# Patient Record
Sex: MALE | Race: WHITE | Employment: FULL TIME | URBAN - METROPOLITAN AREA
[De-identification: names, ages, dates, MRNs, and addresses within clinical notes are randomized per-mention and may not be internally consistent; named-entity substitution may affect disease eponyms.]

---

## 2018-05-17 ENCOUNTER — HOSPITAL ENCOUNTER (OUTPATIENT)
Dept: PHYSICAL THERAPY | Age: 63
Discharge: HOME | End: 2018-05-17
Payer: COMMERCIAL

## 2018-05-17 PROCEDURE — 97140 MANUAL THERAPY 1/> REGIONS: CPT

## 2018-05-17 PROCEDURE — 97110 THERAPEUTIC EXERCISES: CPT

## 2018-05-17 PROCEDURE — 97162 PT EVAL MOD COMPLEX 30 MIN: CPT

## 2018-05-17 NOTE — PROGRESS NOTES
[address] PHYSICAL THERAPY    [address] [place] [address] 51, [address], 70 [facility]       Phone: [phone]  Fax: [phone] / [phone] [facility]  Patient Name: Edwin Granados : 1955   Medical   Diagnosis: R TKA Treatment Diagnosis: Right knee pain [M25.561]   Onset Date: 18     Referral Source: Gerda Biswas NP Start of Care [facility]): 2018   Prior Hospitalization: See medical history Provider #: [id number]   Prior Level of Function: Chronic history of difficulty with walking and standing    Comorbidities: HTN, 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   ===========================================================================================  Assessment / key information:  Patient is a 58year old male presenting to therapy s/p R TKA on 18. Patient reports he stayed in the hospital for 3 days and was then discharged home. Patient received 3 weeks of HHPT, however afterwards was out of town for a week an unable to perform his exercise program. Patient feels like his knee has stiffened up because of this contributing to limited motion and increased pain. Patient reports increased difficulty with walking, standing and sleeping. Patient notes symptoms feel better with exercise. Patient rates pain at worst 9/10 and 4-5/10 at best.   Objective Data: Inspection-Patient ambulates with SPC in L UE, poor TKE on the R, poor heel strike and push off on the R. Atrophy of L calf musculature noted related to PMH. Knee AROM: R 12-89 (97 degrees PROM); L 1-124. Strength Testing: fair quadriceps set on the R, able to perform SLR with minor extensor lag. Flexibility Testing: significant restriction to R HS and quadriceps musculature.  Limited patellar mobility to all planes on the R. Tenderness to R patellar tendon, R quadriceps tendon, R VL. FOTO: 45/100. Patient educated on diagnosis, prognosis, POC and HEP. Patient issued copy of HEP and denied additional questions. Patient will benefit from skilled PT in order to address these impairments and functional limitations.   ===========================================================================================  Eval Complexity: History MEDIUM  Complexity : 1-2 comorbidities / personal factors will impact the outcome/ POC ;  Examination  HIGH Complexity : 4+ Standardized tests and measures addressing body structure, function, activity limitation and / or participation in recreation ; Presentation MEDIUM Complexity : Evolving with changing characteristics ; Decision Making MEDIUM Complexity : FOTO score of 26-74; Overall Complexity MEDIUM  Problem List: pain affecting function, decrease ROM, decrease strength, impaired gait/ balance, decrease ADL/ functional abilitiies, decrease activity tolerance and decrease flexibility/ joint mobility   Treatment Plan may include any combination of the following: Therapeutic exercise, Therapeutic activities, Neuromuscular re-education, Physical agent/modality, Gait/balance training, Manual therapy, Patient education, Functional mobility training and Stair training  Patient / Family readiness to learn indicated by: asking questions, trying to perform skills and interest  Persons(s) to be included in education: patient (P)  Barriers to Learning/Limitations: no  Measures taken:    Patient Goal (s): Reduce pain, improve ROM   Patient self reported health status: good  Rehabilitation Potential: good   Short Term Goals: To be accomplished in  3  weeks:  1. Patient will demonstrate independence with HEP for self management of symptoms. 2. Patient will improve R knee flexion AROM to 110 degrees in order to improve tolerance to walking activities.  Long Term Goals:  To be accomplished in  6  weeks:  1. Patient will improve FOTO to >/= 66/100 in order to improve quality of life. 2. Patient will improve R knee AROM to 0-120 degrees in order to improve tolerance to household activities. 3. Patient will report reduction in pain at worst to 1-2/10 in order to improve tolerance to standing activities. Frequency / Duration:   Patient to be seen  2-3  times per week for 6  weeks:  Patient / Caregiver education and instruction: self care, activity modification and exercises  G-Codes (GP): carlos  Therapist Signature: Valdez Pate PT Date: 5/14/5216   Certification Period: na Time: 10:37 AM   ===========================================================================================  I certify that the above Physical Therapy Services are being furnished while the patient is under my care. I agree with the treatment plan and certify that this therapy is necessary. Physician Signature:        Date:       Time:     Please sign and return to [facility] or you may fax the signed copy to [phone]. Thank you.

## 2018-05-17 NOTE — PROGRESS NOTES
PHYSICAL THERAPY - DAILY TREATMENT NOTE    Patient Name: Sruthi Wallace        Date: 2018  : 1955   YES Patient  Verified  Visit #:   1     Insurance: Payor: Phyllis Clement / Plan: [address] PT / Product Type: Commerical /      In time: 1000 Out time: 09   Total Treatment Time: 35     Medicare Time Tracking (below)   Total Timed Codes (min):  na 1:1 Treatment Time:  na     TREATMENT AREA =  Right knee pain [M25.561]    SUBJECTIVE  Pain Level (on 0 to 10 scale):  4-5  / 10   Medication Changes/New allergies or changes in medical history, any new surgeries or procedures? NO    If yes, update Summary List   Subjective Functional Status/Changes:  []  No changes reported     See POC          OBJECTIVE    5 (NB) min Therapeutic Exercise:  [x]  See flow sheet   Rationale:      increase ROM to improve the patients ability to perform walking activities. 10 min Manual Therapy: R knee flexion PROM, STM to R quadriceps, R patellar mobs   Rationale:      decrease pain, increase ROM, increase tissue extensibility and decrease trigger points to improve patient's ability to perform transfers. min Patient Education:  YES  Reviewed HEP   []  Progressed/Changed HEP based on: Other Objective/Functional Measures:    See POC     Post Treatment Pain Level (on 0 to 10) scale:   4-5  / 10     ASSESSMENT  Assessment/Changes in Function:     See POC     []  See Progress Note/Recertification   Patient will continue to benefit from skilled PT services to modify and progress therapeutic interventions, address functional mobility deficits, address ROM deficits, address strength deficits, analyze and address soft tissue restrictions, analyze and cue movement patterns, analyze and modify body mechanics/ergonomics and assess and modify postural abnormalities to attain remaining goals.    Progress toward goals / Updated goals:    See POC     PLAN  [x]  Upgrade activities as tolerated YES Continue plan of care   []  Discharge due to :    []  Other:      Therapist: Anna Ware, PT    Date: 5/17/2018 Time: 10:43 AM     Future Appointments  Date Time Provider Eunice Wright   5/21/2018 7:30 AM Anna Ware, PT [facility]   5/23/2018 11:00 AM Juan David Guerin, PT [facility]   5/25/2018 8:00 AM Anna Ware, PT [facility]   5/31/2018 2:00 PM Anna Ware, PT [facility]   6/1/2018 3:00 PM Anna Ware, PT [facility]   6/4/2018 8:00 AM Anna Ware, PT [facility]   6/6/2018 8:00 AM Yadira Brooks, PT [facility]   6/8/2018 7:30 AM Anna Ware, PT [facility]   6/11/2018 9:30 AM Yadira Brooks, PT [facility]   6/13/2018 8:30 AM Yadira Brooks, PT [facility]   6/15/2018 8:00 AM Anna Ware, PT [facility]   6/18/2018 10:00 AM Yadira Brooks, PT [facility]   6/20/2018 9:00 AM Yadira Brooks, PT [facility]   6/22/2018 8:00 AM Anna Ware, PT [facility]

## 2018-05-21 ENCOUNTER — HOSPITAL ENCOUNTER (OUTPATIENT)
Dept: PHYSICAL THERAPY | Age: 63
Discharge: HOME | End: 2018-05-21
Payer: COMMERCIAL

## 2018-05-21 PROCEDURE — 97110 THERAPEUTIC EXERCISES: CPT

## 2018-05-21 PROCEDURE — 97140 MANUAL THERAPY 1/> REGIONS: CPT

## 2018-05-21 NOTE — PROGRESS NOTES
PHYSICAL THERAPY - DAILY TREATMENT NOTE    Patient Name: Alfredo Loja        Date: 2018  : 1955   YES Patient  Verified  Visit #:   2     Insurance: Payor: [employer] / Plan: [address] PT / Product Type: Commerical /      In time: 730 Out time: 396   Total Treatment Time: 61     Medicare Time Tracking (below)   Total Timed Codes (min):  na 1:1 Treatment Time:  na     TREATMENT AREA =  Right knee pain [M25.561]    SUBJECTIVE  Pain Level (on 0 to 10 scale):  3-4  / 10   Medication Changes/New allergies or changes in medical history, any new surgeries or procedures? NO    If yes, update Summary List   Subjective Functional Status/Changes:  []  No changes reported     Patient states his knee felt much better after his initial evaluation which he related to the manual therapy. Patient states he was actually able to sit in his car comfortably on his ride to [place] over the weekend. OBJECTIVE  Modalities Rationale:     decrease inflammation and decrease pain to improve patient's ability to perform transfers.     min [] Estim, type/location:                                      []  att     []  unatt     []  w/US     []  w/ice    []  w/heat    min []  Mechanical Traction: type/lbs                   []  pro   []  sup   []  int   []  cont    []  before manual    []  after manual    min []  Ultrasound, settings/location:      min []  Iontophoresis w/ dexamethasone, location:                                               []  take home patch       []  in clinic   10 min [x]  Ice     []  Heat    location/position: To R knee in supine with bolster    min []  Vasopneumatic Device, press/temp:     min []  Other:    [x] Skin assessment post-treatment (if applicable):    [x]  intact    []  redness- no adverse reaction     []redness  adverse reaction:        39 min Therapeutic Exercise:  [x]  See flow sheet   Rationale:      increase ROM, increase strength, improve coordination and improve balance to improve the patients ability to perform walking activities. 12 min Manual Therapy: STM to R quadriceps, R patellar mobs; R HS stretch; R knee flexion PROM   Rationale:      decrease pain, increase ROM, increase tissue extensibility and decrease trigger points to improve patient's ability to perform standing activities. min Patient Education:  YES  Reviewed HEP   []  Progressed/Changed HEP based on: Other Objective/Functional Measures:    Progressed therapy program per flowsheet in order to improve R knee ROM, flexibility and strength  Patient cued throughout session on proper walking mechanics, patient presents with tendency to ambulate with out toeing on the R and was cued several times to correct this  R knee flexion PROM measured to 100 degrees after manual therapy     Post Treatment Pain Level (on 0 to 10) scale:   3  / 10     ASSESSMENT  Assessment/Changes in Function:     Patient tolerated session well. Educated patient to continue with HEP as prescribed and to focus on proper walking mechanics outside of PT. Patient acknowledged understanding. []  See Progress Note/Recertification   Patient will continue to benefit from skilled PT services to modify and progress therapeutic interventions, address functional mobility deficits, address ROM deficits, address strength deficits, analyze and address soft tissue restrictions, analyze and cue movement patterns, analyze and modify body mechanics/ergonomics and assess and modify postural abnormalities to attain remaining goals.    Progress toward goals / Updated goals:    Progressing with STG#2     PLAN  [x]  Upgrade activities as tolerated YES Continue plan of care   []  Discharge due to :    []  Other:      Therapist: Casandra Gonzales, PT    Date: 5/21/2018 Time: 8:53 AM     Future Appointments  Date Time Provider Eunice Wright   5/23/2018 11:00 AM [address] [place] [address], PT [facility]   5/25/2018 8:00 AM Kayla Romberg [facility]   5/31/2018 2:00 PM Erick Grant, PT [facility]   6/1/2018 3:00 PM Erick Grant, PT [facility]   6/4/2018 8:00 AM Erick Grant, PT [facility]   6/6/2018 8:00 AM Monica Brooks, PT [facility]   6/8/2018 7:30 AM Erick Grant, PT [facility]   6/11/2018 9:30 AM Monica Brooks, PT [facility]   6/13/2018 8:30 AM Monica Brooks, PT [facility]   6/15/2018 8:00 AM Erick Grant, PT [facility]   6/18/2018 10:00 AM Monica Brooks, PT [facility]   6/20/2018 9:00 AM Monica Brooks, PT [facility]   6/22/2018 8:00 AM Erick Grant, PT [facility]

## 2018-05-23 ENCOUNTER — HOSPITAL ENCOUNTER (OUTPATIENT)
Dept: PHYSICAL THERAPY | Age: 63
Discharge: HOME | End: 2018-05-23
Payer: COMMERCIAL

## 2018-05-23 PROCEDURE — 97110 THERAPEUTIC EXERCISES: CPT

## 2018-05-23 PROCEDURE — 97140 MANUAL THERAPY 1/> REGIONS: CPT

## 2018-05-23 NOTE — PROGRESS NOTES
PHYSICAL THERAPY - DAILY TREATMENT NOTE    Patient Name: Triny Hill        Date: 2018  : 1955   yes Patient  Verified  Visit #:   3     Insurance: Payor: Melida [employer] / Plan: [address] PT / Product Type: Commerical /      In time: 11:03 Out time: 11:57   Total Treatment Time: 54     Medicare Time Tracking (below)   Total Timed Codes (min):  na 1:1 Treatment Time:  na     TREATMENT AREA =  Right knee pain [M25.561]    SUBJECTIVE  Pain Level (on 0 to 10 scale):  4  / 10   Medication Changes/New allergies or changes in medical history, any new surgeries or procedures?    no  If yes, update Summary List   Subjective Functional Status/Changes:  []  No changes reported     Pt reports his knee felt much better after his last visit. Reports he iced his knee later that evening. Reports partial compliance to HEP due to busy schedule          OBJECTIVE  Modalities Rationale:     decrease inflammation and decrease pain to improve patient's ability to ambulate   min [] Estim, type/location:                                      []  att     []  unatt     []  w/US     []  w/ice    []  w/heat    min []  Mechanical Traction: type/lbs                   []  pro   []  sup   []  int   []  cont    []  before manual    []  after manual    min []  Ultrasound, settings/location:      min []  Iontophoresis w/ dexamethasone, location:                                               []  take home patch       []  in clinic   10 min [x]  Ice     []  Heat    location/position:  To the R knee in supine w/ bolster    min []  Vasopneumatic Device, press/temp:     min []  Other:    [x] Skin assessment post-treatment (if applicable):    [x]  intact    [x]  redness- no adverse reaction     []redness  adverse reaction:        34 min Therapeutic Exercise:  [x]  See flow sheet   Rationale:      increase ROM, increase strength, improve coordination and improve balance to improve the patients ability to ambulate     10 min Manual Therapy: stm to R RF, MFR to medial joint line, inf patella mob, PROM R knee flex, supine HS str   Rationale:      decrease pain, increase ROM, increase tissue extensibility and decrease trigger points to improve patient's ability to ambulate       min Patient Education:  yes  Reviewed HEP   []  Progressed/Changed HEP based on: Other Objective/Functional Measures:    Pt ambulating w/ SPC R, as he states this is better for his balance. Discussed appropriate use of SPC on opposite side of TKA, however pt insisted due to hx of L LE deficits he has to use to on the R    Noted R LE circumduction and ankle EV to clear juan when leading w/ the L LE     Post Treatment Pain Level (on 0 to 10) scale:   3  / 10     ASSESSMENT  Assessment/Changes in Function:     Pt c/o soreness/discomfort at end available range knee flexion PROM. Discussed importance of compliance to HEP as rx     []  See Progress Note/Recertification   Patient will continue to benefit from skilled PT services to modify and progress therapeutic interventions, address functional mobility deficits, address ROM deficits, address strength deficits, analyze and address soft tissue restrictions, analyze and cue movement patterns, analyze and modify body mechanics/ergonomics, assess and modify postural abnormalities and instruct in home and community integration to attain remaining goals.    Progress toward goals / Updated goals:    Slow progress to STG #1, reports minimal compliance to HEP due to busy schedule     PLAN  []  Upgrade activities as tolerated yes Continue plan of care   []  Discharge due to :    []  Other:      Therapist: Lucina Unger PT    Date: 5/23/2018 Time: 11:18 AM     Future Appointments  Date Time Provider Eunice Wright   5/25/2018 8:00 AM Desi Allen PT [facility]   5/31/2018 2:00 PM Desi Allen PT [facility]   6/1/2018 3:00 PM Desi Allen PT [facility]   6/4/2018 8:00 AM [facility]   6/6/2018 8:00 AM Manoj Brooks, PT [facility]   6/8/2018 7:30 AM Luis Sánchez, PT [facility]   6/11/2018 9:30 AM Manoj Brooks[facility]   6/13/2018 8:30 AM Manoj [facility]   6/15/2018 8:00 AM Luis Sánchez, PT [facility]   6/18/2018 10:00 AM Manoj Brooks, PT [facility]   6/20/2018 9:00 AM Manoj Brooks, PT [facility]   6/22/2018 8:00 AM Luis Sánchez, PT [facility]

## 2018-05-25 ENCOUNTER — HOSPITAL ENCOUNTER (OUTPATIENT)
Dept: PHYSICAL THERAPY | Age: 63
Discharge: HOME | End: 2018-05-25
Payer: COMMERCIAL

## 2018-05-25 PROCEDURE — 97110 THERAPEUTIC EXERCISES: CPT

## 2018-05-25 PROCEDURE — 97140 MANUAL THERAPY 1/> REGIONS: CPT

## 2018-05-25 NOTE — PROGRESS NOTES
PHYSICAL THERAPY - DAILY TREATMENT NOTE    Patient Name: Mayank Rocha        Date: 2018  : 1955   YES Patient  Verified  Visit #:   4     Insurance: Payor: Marina Cantu / Plan: [address] PT / Product Type: Commerical /      In time: 530 Out time: 900   Total Treatment Time: 58     Medicare Time Tracking (below)   Total Timed Codes (min):  na 1:1 Treatment Time:  na     TREATMENT AREA =  Right knee pain [M25.561]    SUBJECTIVE  Pain Level (on 0 to 10 scale):  4  / 10   Medication Changes/New allergies or changes in medical history, any new surgeries or procedures? NO    If yes, update Summary List   Subjective Functional Status/Changes:  []  No changes reported     Patient states his knee is sore today because he was trying to fix his car most of yesterday and crawling into the tight spaces was difficulty. OBJECTIVE  Modalities Rationale:     decrease inflammation and decrease pain to improve patient's ability to perform transfers.     min [] Estim, type/location:                                      []  att     []  unatt     []  w/US     []  w/ice    []  w/heat    min []  Mechanical Traction: type/lbs                   []  pro   []  sup   []  int   []  cont    []  before manual    []  after manual    min []  Ultrasound, settings/location:      min []  Iontophoresis w/ dexamethasone, location:                                               []  take home patch       []  in clinic   10 min [x]  Ice     []  Heat    location/position: To R knee in supine with bolster    min []  Vasopneumatic Device, press/temp:     min []  Other:    [x] Skin assessment post-treatment (if applicable):    [x]  intact    []  redness- no adverse reaction     []redness  adverse reaction:         33 min Therapeutic Exercise:  [x]  See flow sheet   Rationale:      increase ROM, increase strength, improve coordination and improve balance to improve the patients ability to perform walking activities. 15 min Manual Therapy: STM to R quadriceps, R patellar mobs, R HS stretch, R knee flexion PROM   Rationale:      decrease pain, increase ROM, increase tissue extensibility and decrease trigger points to improve patient's ability to perform standing activities. min Patient Education:  YES  Reviewed HEP   []  Progressed/Changed HEP based on: Other Objective/Functional Measures:    R knee AROM post manual therapy 8-105 degrees  Continued tenderness to lateral quadriceps musculature during MT  Cuing throughout juan negotiation in order to improve heel strike and avoid hip circumduction on the R     Post Treatment Pain Level (on 0 to 10) scale:   4  / 10     ASSESSMENT  Assessment/Changes in Function:     Patient denied changes post session. Educated to continue with HEP on a regular basis in order to further improve ROM and gait  deficits. []  See Progress Note/Recertification   Patient will continue to benefit from skilled PT services to modify and progress therapeutic interventions, address functional mobility deficits, address ROM deficits, address strength deficits, analyze and address soft tissue restrictions, analyze and cue movement patterns, analyze and modify body mechanics/ergonomics and assess and modify postural abnormalities to attain remaining goals.    Progress toward goals / Updated goals:    Progressing with LTG#2     PLAN  [x]  Upgrade activities as tolerated YES Continue plan of care   []  Discharge due to :    []  Other:      Therapist: Sydni Collins PT    Date: 5/25/2018 Time: 9:25 AM     Future Appointments  Date Time Provider Eunice Wright   5/31/2018 2:00 PM Sydni Collins PT [facility]   6/1/2018 3:00 PM Sydni Collins PT [facility]   6/4/2018 8:00 AM Sydni Collins PT [facility]   6/6/2018 8:00 AM [address] [place] [address]   6/8/2018 7:30 AM Sydni Collins PT [facility]   6/11/2018 9:30 AM [address] [place] [address], [facility] 6/13/2018 8:30 AM Jihan Brooks, PT [facility]   6/15/2018 8:00 AM Ana María Vega, PT [facility]   6/18/2018 10:00 AM Jihan Brooks, PT [facility]   6/20/2018 9:00 AM Jihan Brooks, PT [facility]   6/22/2018 8:00 AM Ana María Vega, PT [facility]

## 2018-05-29 ENCOUNTER — HOSPITAL ENCOUNTER (OUTPATIENT)
Dept: PHYSICAL THERAPY | Age: 63
Discharge: HOME | End: 2018-05-29
Payer: COMMERCIAL

## 2018-05-29 PROCEDURE — 97140 MANUAL THERAPY 1/> REGIONS: CPT

## 2018-05-29 NOTE — PROGRESS NOTES
PHYSICAL THERAPY - DAILY TREATMENT NOTE    Patient Name: Alfredo Loja        Date: 2018  : 1955   YES Patient  Verified  Visit #:     Insurance: Payor: Tori Deandre / Plan: [address] / Product Type: Commerical /      In time:  Out time:    Total Treatment Time: 28     Medicare Time Tracking (below)   Total Timed Codes (min):  na 1:1 Treatment Time:  na     TREATMENT AREA =  Right knee pain [M25.561]    SUBJECTIVE  Pain Level (on 0 to 10 scale):  3  / 10   Medication Changes/New allergies or changes in medical history, any new surgeries or procedures? NO    If yes, update Summary List   Subjective Functional Status/Changes:  []  No changes reported     Patient reports a 60% improvement in symptoms since the start of therapy. Patient reports improved ability to transfer in and out of his car as well as improved motion overall. Patient rates his pain at worst as a 7/10 and on average 3.5/10. OBJECTIVE    6 (NB) min Therapeutic Exercise:  [x]  See flow sheet   Rationale:      increase ROM to improve the patients ability to perform walking activities. 22 min Manual Therapy: STM to R quadriceps, R patellar mobs, R HS stretch, R knee flexion PROM   Rationale:      decrease pain, increase ROM, increase tissue extensibility and decrease trigger points to improve patient's ability to perform standing activities. min Patient Education:  YES  Reviewed HEP   []  Progressed/Changed HEP based on:        Other Objective/Functional Measures:    See PN     Post Treatment Pain Level (on 0 to 10) scale:   5  / 10     ASSESSMENT  Assessment/Changes in Function:     See PN     []  See Progress Note/Recertification   Patient will continue to benefit from skilled PT services to modify and progress therapeutic interventions, address functional mobility deficits, address ROM deficits, address strength deficits, analyze and address soft tissue restrictions, analyze and cue movement patterns, analyze and modify body mechanics/ergonomics and assess and modify postural abnormalities to attain remaining goals.    Progress toward goals / Updated goals:    See PN     PLAN  [x]  Upgrade activities as tolerated YES Continue plan of care   []  Discharge due to :    []  Other:      Therapist: Ghulam Gallardo PT    Date: 5/29/2018 Time: 2:25 PM     Future Appointments  Date Time Provider Eunice Wright   5/31/2018 2:00 PM Ghulam Gallardo, PT [facility]   6/1/2018 3:00 PM Ghulam Gallardo, PT [facility]   6/4/2018 8:00 AM Ghulam Gallardo, PT [facility]   6/6/2018 8:00 AM Astrid Brooks, PT [facility]   6/8/2018 7:30 AM Ghulam Gallardo, PT [facility]   6/11/2018 9:30 AM Astrid Brooks, PT [facility]   6/13/2018 8:30 AM Astrid Brooks, PT [facility]   6/15/2018 8:00 AM Ghulam Gallardo, PT [facility]   6/18/2018 10:00 AM Astrid Brooks, PT [facility]   6/20/2018 9:00 AM Astrid Brooks, PT [facility]   6/22/2018 8:00 AM Ghulam Gallardo, PT [facility]

## 2018-05-29 NOTE — PROGRESS NOTES
[address]  [facility] THERAPY  [address] [facility] [address],[facility], [address] [facility] - Phone: [phone]  Fax: [phone]  PROGRESS NOTE  Patient Name: Elvin Gutierres : 1955   Treatment/Medical Diagnosis: Right knee pain [M25.561]   Referral Source: Rosalinda Nesbitt NP     Date of Initial Visit: 18 Attended Visits: 5 Missed Visits: 0     SUMMARY OF TREATMENT  Patient has attended 4 follow up visits after his initial evaluation on 18. Patient has received therapeutic exercise, manual therapy and modalities in order to improve R knee ROM, mobility, flexibility, strength and gait mechanics. CURRENT STATUS  Patient reports a 60% improvement in symptoms since the start of therapy. Patient feels his motion has gradually improved which has helped him get in and out of his car easier. Patient has previously reported moderate compliance with his home exercises. Patient rates his pain at worst as a 7/10 and on average 3.5/10. Objectively, patient's R knee AROM is as follows: 8-110 after manual therapy therapy. Patient does require cuing on proper gait mechanics as he demonstrates a tendency to ambulate with poor heel strike and out toeing on the R. Patient would continue to benefit from PT in order to further address deficits and improve function. Goal/Measure of Progress Goal Met? 1. Patient will improve FOTO to >/= 66/100 in order to improve quality of life   Status at last Eval: 45/100 Current Status: n/a n/a   2. Patient will improve R knee AROM to 0-120 degrees in order to improve tolerance to household activities   Status at last Eval:  Current Status: 8-110 progressing   3. Patient will report reduction in pain at worst to 1-2/10 in order to improve tolerance to standing activities   Status at last Eval: 9/10 Current Status: 7/10 progressing     New Goals to be achieved in __3__  weeks:  1. Continue with LTG#1  2. Continue with LTG#2  3. Continue with LTG#3  RECOMMENDATIONS  Patient would benefit from continued PT 1-2x/week for 3 weeks in order to further address impairments and functional limitations. If you have any questions/comments please contact us directly at [phone]. Thank you for allowing us to assist in the care of your patient. Therapist Signature: Jacquelyn Aguilar, PT Date: 5/29/2018     Time: 2:15 PM   NOTE TO PHYSICIAN:  PLEASE COMPLETE THE ORDERS BELOW AND FAX TO   [facility] Physical [facility]: [phone]  If you are unable to process this request in 24 hours please contact our office: [phone]    ___ I have read the above report and request that my patient continue as recommended.   ___ I have read the above report and request that my patient continue therapy with the following changes/special instructions:_________________________________________________________   ___ I have read the above report and request that my patient be discharged from therapy.      Physician Signature:        Date:       Time:

## 2018-05-31 ENCOUNTER — HOSPITAL ENCOUNTER (OUTPATIENT)
Dept: PHYSICAL THERAPY | Age: 63
Discharge: HOME | End: 2018-05-31
Payer: COMMERCIAL

## 2018-05-31 PROCEDURE — 97140 MANUAL THERAPY 1/> REGIONS: CPT

## 2018-05-31 PROCEDURE — 97110 THERAPEUTIC EXERCISES: CPT

## 2018-05-31 NOTE — PROGRESS NOTES
PHYSICAL THERAPY - DAILY TREATMENT NOTE    Patient Name: Alfredo Loja        Date: 2018  : 1955   YES Patient  Verified  Visit #:     Insurance: Payor: Tori Carrera / Plan: [address] / Product Type: Commerical /      In time: 202 Out time: 253   Total Treatment Time: 51     Medicare Time Tracking (below)   Total Timed Codes (min):  na 1:1 Treatment Time:  na     TREATMENT AREA =  Right knee pain [M25.561]    SUBJECTIVE  Pain Level (on 0 to 10 scale):  5  / 10   Medication Changes/New allergies or changes in medical history, any new surgeries or procedures? NO    If yes, update Summary List   Subjective Functional Status/Changes:  []  No changes reported     Patient reports his follow up appointment with his MD went well, were pleased with his overall progress. Patient states his knee is a little more sore today which he relates to being busy the past few days. OBJECTIVE  Modalities Rationale:     decrease inflammation and decrease pain to improve patient's ability to perform transfers.     min [] Estim, type/location:                                      []  att     []  unatt     []  w/US     []  w/ice    []  w/heat    min []  Mechanical Traction: type/lbs                   []  pro   []  sup   []  int   []  cont    []  before manual    []  after manual    min []  Ultrasound, settings/location:      min []  Iontophoresis w/ dexamethasone, location:                                               []  take home patch       []  in clinic   10 min [x]  Ice     []  Heat    location/position: To R knee in supine with bolster    min []  Vasopneumatic Device, press/temp:     min []  Other:    [x] Skin assessment post-treatment (if applicable):    [x]  intact    []  redness- no adverse reaction     []redness  adverse reaction:        31 min Therapeutic Exercise:  [x]  See flow sheet   Rationale:      increase ROM, increase strength, improve coordination and improve balance to improve the patients ability to perform walking activities. 12 min Manual Therapy: STM to R quadriceps, R patellar mobs, R HS stretch, R knee flexion PROM   Rationale:      decrease pain, increase ROM, increase tissue extensibility and decrease trigger points to improve patient's ability to perform work activities. min Patient Education:  YES  Reviewed HEP   []  Progressed/Changed HEP based on: Other Objective/Functional Measures:    R knee flexion PROM during manual therapy 112 degrees, 118 degrees with AAROM during heel slides  Improved technique with juan negotiation this session, cleared small hurdles without compensatory strategies. Post Treatment Pain Level (on 0 to 10) scale:   4  / 10     ASSESSMENT  Assessment/Changes in Function:     Patient reported slight reduction in symptoms post session. Patient is progressing well in regards to knee ROM and function at this time. []  See Progress Note/Recertification   Patient will continue to benefit from skilled PT services to modify and progress therapeutic interventions, address functional mobility deficits, address ROM deficits, address strength deficits, analyze and address soft tissue restrictions, analyze and cue movement patterns, analyze and modify body mechanics/ergonomics and assess and modify postural abnormalities to attain remaining goals.    Progress toward goals / Updated goals:    Progressing well with LTG#2     PLAN  [x]  Upgrade activities as tolerated YES Continue plan of care   []  Discharge due to :    []  Other:      Therapist: Marlon Beck PT    Date: 5/31/2018 Time: 2:48 PM     Future Appointments  Date Time Provider Eunice Wright   6/1/2018 3:00 PM Marlon Beck PT [facility]   6/4/2018 8:00 AM Marlon Beck PT [facility]   6/6/2018 8:00 AM Manjinder Stark PT [facility]   6/8/2018 7:30 AM Marlon Beck PT [facility]   6/11/2018 9:30 AM Manjinder Stark PT [facility]   6/13/2018 8:30 AM Leda Fox, PT [facility]   6/15/2018 8:00 AM Ebonie Eisenberg, PT [facility]   6/18/2018 10:00 AM Dontae Brooks, PT [facility]   6/20/2018 9:00 AM Dontae Brooks, PT [facility]   6/22/2018 8:00 AM Ebonie Eisenberg, PT [facility]

## 2018-06-01 ENCOUNTER — HOSPITAL ENCOUNTER (OUTPATIENT)
Dept: PHYSICAL THERAPY | Age: 63
Discharge: HOME | End: 2018-06-01
Payer: COMMERCIAL

## 2018-06-01 PROCEDURE — 97110 THERAPEUTIC EXERCISES: CPT

## 2018-06-01 PROCEDURE — 97140 MANUAL THERAPY 1/> REGIONS: CPT

## 2018-06-01 NOTE — PROGRESS NOTES
PHYSICAL THERAPY - DAILY TREATMENT NOTE    Patient Name: Mayank Rocha        Date: 2018  : 1955   YES Patient  Verified  Visit #:     Insurance: Payor: Marina Cantu / Plan: [address] PT / Product Type: Commerical /      In time: 305 Out time: 400   Total Treatment Time: 55     Medicare Time Tracking (below)   Total Timed Codes (min):  na 1:1 Treatment Time:  na     TREATMENT AREA =  Right knee pain [M25.561]    SUBJECTIVE  Pain Level (on 0 to 10 scale):  5  / 10   Medication Changes/New allergies or changes in medical history, any new surgeries or procedures? NO    If yes, update Summary List   Subjective Functional Status/Changes:  []  No changes reported     Patient reports he is sore from doing a lot of \"running around\" prior to today's session. OBJECTIVE  Modalities Rationale:     decrease inflammation and decrease pain to improve patient's ability to perform transfers. min [] Estim, type/location:                                      []  att     []  unatt     []  w/US     []  w/ice    []  w/heat    min []  Mechanical Traction: type/lbs                   []  pro   []  sup   []  int   []  cont    []  before manual    []  after manual    min []  Ultrasound, settings/location:      min []  Iontophoresis w/ dexamethasone, location:                                               []  take home patch       []  in clinic   10 min [x]  Ice     []  Heat    location/position: To R knee in supine with bolster    min []  Vasopneumatic Device, press/temp:     min []  Other:    [x] Skin assessment post-treatment (if applicable):    [x]  intact    []  redness- no adverse reaction     []redness  adverse reaction:        30 min Therapeutic Exercise:  [x]  See flow sheet   Rationale:      increase ROM, increase strength, improve coordination and improve balance to improve the patients ability to perform walking activities.       15 min Manual Therapy: STM to R quadriceps, R patellar mobs, R HS stretch, R knee flexion PROM   Rationale:      decrease pain, increase ROM, increase tissue extensibility and decrease trigger points to improve patient's ability to perform standing activities. min Patient Education:  YES  Reviewed HEP   []  Progressed/Changed HEP based on: Other Objective/Functional Measures:    R knee flexion PROM measured to 114 degrees after manual therapy  Added SL LP with 60# this session for improved quadriceps strength-completed with minimal difficulty, plan to progress at NV  Progressed step ups to 8 inches this session     Post Treatment Pain Level (on 0 to 10) scale:   4  / 10     ASSESSMENT  Assessment/Changes in Function:     Patient tolerated progression of program well this visit. Patient is demonstrating improved R knee AROM and strength over the past several visits contributing to improved ADL function. []  See Progress Note/Recertification   Patient will continue to benefit from skilled PT services to modify and progress therapeutic interventions, address functional mobility deficits, address ROM deficits, address strength deficits, analyze and address soft tissue restrictions, analyze and cue movement patterns, analyze and modify body mechanics/ergonomics, assess and modify postural abnormalities and address imbalance/dizziness to attain remaining goals.    Progress toward goals / Updated goals:    Progressing with LTG#3     PLAN  [x]  Upgrade activities as tolerated YES Continue plan of care   []  Discharge due to :    []  Other:      Therapist: Theresa Hirsch PT    Date: 6/1/2018 Time: 4:09 PM     Future Appointments  Date Time Provider Eunice Wright   6/4/2018 8:00 AM Theresa Hirsch PT [facility]   6/6/2018 8:00 AM [address] [place] [address], PT [facility]   6/8/2018 7:30 AM Theresa Hircsh PT [facility]   6/11/2018 9:30 AM Wilber Brooks, PT [facility]   6/13/2018 8:30 AM Wilber Brooks, PT [facility]   6/15/2018 8:00 AM Yonny Hopper [facility]   6/18/2018 10:00 AM Wilber Brooks, PT [facility]   6/20/2018 9:00 AM Ivan Noland, PT [facility]   6/22/2018 8:00 AM Theresa Hirsch, PT [facility]

## 2018-06-04 ENCOUNTER — HOSPITAL ENCOUNTER (OUTPATIENT)
Dept: PHYSICAL THERAPY | Age: 63
Discharge: HOME | End: 2018-06-04
Payer: COMMERCIAL

## 2018-06-04 PROCEDURE — 97110 THERAPEUTIC EXERCISES: CPT

## 2018-06-04 PROCEDURE — 97140 MANUAL THERAPY 1/> REGIONS: CPT

## 2018-06-04 NOTE — PROGRESS NOTES
PHYSICAL THERAPY - DAILY TREATMENT NOTE    Patient Name: Diana oRbert        Date: 2018  : 1955   YES Patient  Verified  Visit #:     Insurance: Payor: Anabela Reyes / Plan: [address] / Product Type: Commerical /      In time: 232 Out time: 682   Total Treatment Time: 55     Medicare Time Tracking (below)   Total Timed Codes (min):  na 1:1 Treatment Time:  na     TREATMENT AREA =  Right knee pain [M25.561]    SUBJECTIVE  Pain Level (on 0 to 10 scale):  2-3  / 10   Medication Changes/New allergies or changes in medical history, any new surgeries or procedures? NO    If yes, update Summary List   Subjective Functional Status/Changes:  []  No changes reported     Patient reports his knee is feeling better today compared to last week. Patient denies complications since his LV. OBJECTIVE  Modalities Rationale:     decrease inflammation and decrease pain to improve patient's ability to perform transfers. min [] Estim, type/location:                                      []  att     []  unatt     []  w/US     []  w/ice    []  w/heat    min []  Mechanical Traction: type/lbs                   []  pro   []  sup   []  int   []  cont    []  before manual    []  after manual    min []  Ultrasound, settings/location:      min []  Iontophoresis w/ dexamethasone, location:                                               []  take home patch       []  in clinic   10 min [x]  Ice     []  Heat    location/position: To R knee in supine with bolster    min []  Vasopneumatic Device, press/temp:     min []  Other:    [x] Skin assessment post-treatment (if applicable):    [x]  intact    []  redness- no adverse reaction     []redness  adverse reaction:        30 min Therapeutic Exercise:  [x]  See flow sheet   Rationale:      increase ROM, increase strength, improve coordination and improve balance to improve the patients ability to perform walking activities.       15 min Manual Therapy: STM to R quadriceps, R patellar mobs, R HS stretch, R knee flexion PROM   Rationale:      decrease pain, increase ROM, increase tissue extensibility and decrease trigger points to improve patient's ability to perform standing activities. min Patient Education:  YES  Reviewed HEP   []  Progressed/Changed HEP based on: Other Objective/Functional Measures:    R knee flexion measured to 118 degrees after manual therapy. Progressed SL LP to 62# without complication or significant increase in fatigue  Continues to require cuing to correct R out toeing during general gait as well as juan negotiation     Post Treatment Pain Level (on 0 to 10) scale:   4  / 10     ASSESSMENT  Assessment/Changes in Function:     Patient continues to respond well to PT treatment as he is progressing with AROM and strength. Patient continues to demonstrate altered gait  which can be partially attributed PMH and deficits with L LE.      []  See Progress Note/Recertification   Patient will continue to benefit from skilled PT services to modify and progress therapeutic interventions, address functional mobility deficits, address ROM deficits, address strength deficits, analyze and address soft tissue restrictions, analyze and cue movement patterns, analyze and modify body mechanics/ergonomics, assess and modify postural abnormalities and address imbalance/dizziness to attain remaining goals.    Progress toward goals / Updated goals:    Progressing with LTG#2     PLAN  [x]  Upgrade activities as tolerated YES Continue plan of care   []  Discharge due to :    []  Other:      Therapist: Sydni Collins PT    Date: 6/4/2018 Time: 9:39 AM     Future Appointments  Date Time Provider Eunice Wright   6/6/2018 8:00 AM [address] [place] [address], PT [facility]   6/8/2018 7:30 AM Sydni Collins PT [facility]   6/11/2018 9:30 AM [address] [place] [address], PT [facility]   6/13/2018 8:30 AM [address] [place] [address], PT [facility]   6/15/2018 8:00 AM Cesar Mistry, PT [place] [address]   6/18/2018 10:00 AM Rafy Brooks, PT [place] [address]   6/20/2018 9:00 AM Dejah Murphy, PT [place] [address]   6/22/2018 8:00 AM Cesar Mistry, PT [address]

## 2018-06-06 ENCOUNTER — HOSPITAL ENCOUNTER (OUTPATIENT)
Dept: PHYSICAL THERAPY | Age: 63
Discharge: HOME | End: 2018-06-06
Payer: COMMERCIAL

## 2018-06-06 PROCEDURE — 97110 THERAPEUTIC EXERCISES: CPT

## 2018-06-06 PROCEDURE — 97140 MANUAL THERAPY 1/> REGIONS: CPT

## 2018-06-06 NOTE — PROGRESS NOTES
PHYSICAL THERAPY - DAILY TREATMENT NOTE    Patient Name: Kash Huffman        Date: 2018  : 1955   yes Patient  Verified  Visit #:     Insurance: Payor: Sher Salguero / Plan: [address] / Product Type: Commerical /      In time: 8:02 Out time: 8:53   Total Treatment Time: 51     Medicare Time Tracking (below)   Total Timed Codes (min):  na 1:1 Treatment Time:  na     TREATMENT AREA =  Right knee pain [M25.561]    SUBJECTIVE  Pain Level (on 0 to 10 scale):  5  / 10   Medication Changes/New allergies or changes in medical history, any new surgeries or procedures?    no  If yes, update Summary List   Subjective Functional Status/Changes:  []  No changes reported     Pt reports he was powerwashing his patio yesterday, partially on a ladder, and noted inc pain during and after. Reports he iced his knee last night. Has not taken motrin yet today          OBJECTIVE  Modalities Rationale:     decrease inflammation and decrease pain to improve patient's ability to ambulate   min [] Estim, type/location:                                      []  att     []  unatt     []  w/US     []  w/ice    []  w/heat    min []  Mechanical Traction: type/lbs                   []  pro   []  sup   []  int   []  cont    []  before manual    []  after manual    min []  Ultrasound, settings/location:      min []  Iontophoresis w/ dexamethasone, location:                                               []  take home patch       []  in clinic   10 min [x]  Ice     []  Heat    location/position:  To the R knee in supine w/ bolster     min []  Vasopneumatic Device, press/temp:     min []  Other:    [x] Skin assessment post-treatment (if applicable):    [x]  intact    [x]  redness- no adverse reaction     []redness  adverse reaction:        26 min Therapeutic Exercise:  [x]  See flow sheet   Rationale:      increase ROM, increase strength, improve balance and increase proprioception to improve the patients ability to ambulate, transfer     15 min Manual Therapy: cfm to incision, inf patella mob, STM/MFR to R RF, PROM knee flex, supine hs str   Rationale:      decrease pain, increase ROM, increase tissue extensibility and decrease trigger points to improve patient's ability to ambulate     min Patient Education:  yes  Reviewed HEP   []  Progressed/Changed HEP based on: Other Objective/Functional Measures:    Inc SL LP to 100# this visit  Mild inc in effusion to medial joint line noted   AROM post manual and heel slides 121 deg     Post Treatment Pain Level (on 0 to 10) scale:   3  / 10     ASSESSMENT  Assessment/Changes in Function:     Pt demonstrating steady improvements in knee flexion AROM, though does cont to c/o pain at end-range. Noted extensor lag in SLR flexion, corrected w/ cueing     []  See Progress Note/Recertification   Patient will continue to benefit from skilled PT services to modify and progress therapeutic interventions, address functional mobility deficits, address ROM deficits, address strength deficits, analyze and address soft tissue restrictions, analyze and cue movement patterns, analyze and modify body mechanics/ergonomics, address imbalance/dizziness and instruct in home and community integration to attain remaining goals.    Progress toward goals / Updated goals:    Met LTG #2 this visit     PLAN  []  Upgrade activities as tolerated yes Continue plan of care   []  Discharge due to :    []  Other:      Therapist: Yobani June PT    Date: 6/6/2018 Time: 8:23 AM     Future Appointments  Date Time Provider Eunice Wright   6/8/2018 7:30 AM Glendy Ojeda PT [facility]   6/11/2018 9:30 AM Yobani June PT [facility]   6/13/2018 8:30 AM Allen Brooks PT [facility]   6/15/2018 8:00 AM Glendy Ojeda PT [facility]   6/18/2018 10:00 AM Allen Brooks PT [facility]   6/20/2018 9:00 AM Allen Brooks PT [facility]   6/22/2018 8:00 AM Glendy Ojeda, PT [facility]

## 2018-06-08 ENCOUNTER — HOSPITAL ENCOUNTER (OUTPATIENT)
Dept: PHYSICAL THERAPY | Age: 63
Discharge: HOME | End: 2018-06-08
Payer: COMMERCIAL

## 2018-06-08 PROCEDURE — 97140 MANUAL THERAPY 1/> REGIONS: CPT

## 2018-06-08 NOTE — PROGRESS NOTES
PHYSICAL THERAPY - DAILY TREATMENT NOTE    Patient Name: Salvador Olmedo        Date: 2018  : 1955   YES Patient  Verified  Visit #:   10   of   12-18  Insurance: Payor: Jameel Dolan / Plan: [address] [facility] / Product Type: Commerical /      In time: 181 Out time: 758   Total Treatment Time: 23     Medicare Time Tracking (below)   Total Timed Codes (min):  na 1:1 Treatment Time:  na     TREATMENT AREA =  Right knee pain [M25.561]    SUBJECTIVE  Pain Level (on 0 to 10 scale):  3  / 10   Medication Changes/New allergies or changes in medical history, any new surgeries or procedures? NO    If yes, update Summary List   Subjective Functional Status/Changes:  []  No changes reported     Patient requests to leave today's appointment by 8 am due to another scheduled appointment. Patient reports his knee is doing well today, denies complications since his LV. OBJECTIVE    6 min Therapeutic Exercise:  [x]  See flow sheet   Rationale:      increase ROM to improve the patients ability to perform recreational activities. 17 min Manual Therapy: STM to R quadriceps, R patellar mobs, R knee flexion PROM   Rationale:      decrease pain, increase ROM, increase tissue extensibility and decrease trigger points to improve patient's ability to perform walking activities. min Patient Education:  YES  Reviewed HEP   []  Progressed/Changed HEP based on: Other Objective/Functional Measures:    Held on TE program this session secondary to patient request to leave session early, plan to resume at [address]  Patient demonstrating 121 degrees of knee flexion AAROM after manual therapy. Post Treatment Pain Level (on 0 to 10) scale:   3/ 10     ASSESSMENT  Assessment/Changes in Function:     Patient denied changes in symptoms post session. Plan to resume full program at [address]. Patient progressing well with PT program at this time.       []  See Progress Note/Recertification   Patient will continue to benefit from skilled PT services to modify and progress therapeutic interventions, address functional mobility deficits, address ROM deficits, address strength deficits, analyze and address soft tissue restrictions, analyze and cue movement patterns, analyze and modify body mechanics/ergonomics, assess and modify postural abnormalities and address imbalance/dizziness to attain remaining goals.    Progress toward goals / Updated goals:    Progressing well with LTG#2 and LTG#3     PLAN  [x]  Upgrade activities as tolerated YES Continue plan of care   []  Discharge due to :    []  Other:      Therapist: Delvin Saez PT    Date: 6/8/2018 Time: 8:15 AM     Future Appointments  Date Time Provider Eunice Wright   6/11/2018 9:30 AM [address] [place] [address], PT [facility]   6/13/2018 8:30 AM [address] [place] [address], PT [facility]   6/15/2018 8:00 AM Delvin Saez PT [facility]   6/18/2018 10:00 AM Finesse Brooks, PT [facility]   6/20/2018 9:00 AM Finesse Brooks, PT [facility]   6/22/2018 8:00 AM Delvin Saez, PT [facility]

## 2018-06-11 ENCOUNTER — HOSPITAL ENCOUNTER (OUTPATIENT)
Dept: PHYSICAL THERAPY | Age: 63
Discharge: HOME | End: 2018-06-11
Payer: COMMERCIAL

## 2018-06-11 PROCEDURE — 97110 THERAPEUTIC EXERCISES: CPT

## 2018-06-11 PROCEDURE — 97140 MANUAL THERAPY 1/> REGIONS: CPT

## 2018-06-11 NOTE — PROGRESS NOTES
PHYSICAL THERAPY - DAILY TREATMENT NOTE    Patient Name: Kelly Dai        Date: 2018  : 1955   yes Patient  Verified  Visit #:     Insurance: Payor: Sydney Cerda / Plan:  [address] [facility] / Product Type: Commerical /      In time: 9:35 Out time: 10:28   Total Treatment Time: 53     Medicare/[facility] Time Tracking (below)   Total Timed Codes (min):  na 1:1 Treatment Time:  na     TREATMENT AREA =  Right knee pain [M25.561]    SUBJECTIVE  Pain Level (on 0 to 10 scale):  3  / 10   Medication Changes/New allergies or changes in medical history, any new surgeries or procedures?    no  If yes, update Summary List   Subjective Functional Status/Changes:  []  No changes reported     Pt reports his knee is feeling well today however notes some soreness due to standing on his boat every day this weekend. Reports he returns to work in 1 week         OBJECTIVE  Modalities Rationale:     decrease inflammation and decrease pain to improve patient's ability to ambulate   min [] Estim, type/location:                                      []  att     []  unatt     []  w/US     []  w/ice    []  w/heat    min []  Mechanical Traction: type/lbs                   []  pro   []  sup   []  int   []  cont    []  before manual    []  after manual    min []  Ultrasound, settings/location:      min []  Iontophoresis w/ dexamethasone, location:                                               []  take home patch       []  in clinic   10 min [x]  Ice     []  Heat    location/position:  To the R knee in supine w/ bolster    min []  Vasopneumatic Device, press/temp:     min []  Other:    [] Skin assessment post-treatment (if applicable):    []  intact    []  redness- no adverse reaction     []redness  adverse reaction:        33 min Therapeutic Exercise:  [x]  See flow sheet   Rationale:      increase ROM, increase strength and improve coordination to improve the patients ability to ambulate     10 min Manual Therapy: stm to RF, scar massage, inf patella mob, PROM knee flexion   Rationale:      decrease pain, increase ROM, increase tissue extensibility and decrease trigger points to improve patient's ability to ambulate     min Patient Education:  yes  Reviewed HEP   []  Progressed/Changed HEP based on: Other Objective/Functional Measures:    Pt requiring cueing to prevent genu valgum on SL LP as well as to reduce use of momentum and UE push on 8\" step ups forward  Extensor lag noted SLR flexion   FOTO score 64/100  GROC +6     Post Treatment Pain Level (on 0 to 10) scale:   2  / 10     ASSESSMENT  Assessment/Changes in Function:     Pt challenged w/ knee flexion this visit due to c/o feeling tight and sore. []  See Progress Note/Recertification   Patient will continue to benefit from skilled PT services to modify and progress therapeutic interventions, address functional mobility deficits, address ROM deficits, address strength deficits, analyze and address soft tissue restrictions, analyze and cue movement patterns, analyze and modify body mechanics/ergonomics, address imbalance/dizziness and instruct in home and community integration to attain remaining goals.    Progress toward goals / Updated goals:    LTG #1 mostly met, FOTO score 64/100 (goal 66/100)     PLAN  []  Upgrade activities as tolerated yes Continue plan of care   []  Discharge due to :    []  Other:      Therapist: Mynor Tabares PT    Date: 6/11/2018 Time: 9:51 AM     Future Appointments  Date Time Provider Eunice Wright   6/13/2018 8:30 AM Mynor Tabares PT [facility]   6/15/2018 8:00 AM Sydni Collins PT [facility]   6/18/2018 10:00 AM Michelle Brooks PT [facility]   6/20/2018 9:00 AM Mynor Tabares PT [facility]   6/22/2018 8:00 AM Sydni Collins PT [facility]

## 2018-06-13 ENCOUNTER — HOSPITAL ENCOUNTER (OUTPATIENT)
Dept: PHYSICAL THERAPY | Age: 63
Discharge: HOME | End: 2018-06-13
Payer: COMMERCIAL

## 2018-06-13 PROCEDURE — 97140 MANUAL THERAPY 1/> REGIONS: CPT

## 2018-06-13 PROCEDURE — 97110 THERAPEUTIC EXERCISES: CPT

## 2018-06-13 NOTE — PROGRESS NOTES
PHYSICAL THERAPY - DAILY TREATMENT NOTE    Patient Name: Mago Tay        Date: 2018  : 1955   yes Patient  Verified  Visit #:     Insurance: Payor: Chung Denis / Plan: [address] [facility] / Product Type: Commerical /      In time: 8:31 Out time: 9:25   Total Treatment Time: 54     Medicare/[facility] Time Tracking (below)   Total Timed Codes (min):  na 1:1 Treatment Time:  na     TREATMENT AREA =  Right knee pain [M25.561]    SUBJECTIVE  Pain Level (on 0 to 10 scale):  4  / 10   Medication Changes/New allergies or changes in medical history, any new surgeries or procedures?    no  If yes, update Summary List   Subjective Functional Status/Changes:  []  No changes reported     Pt reports inc soreness after last visit which he relates to increasing weight on the leg press. \"I go back to work on [employer]       OBJECTIVE  Modalities Rationale:     decrease inflammation and decrease pain to improve patient's ability to ambulate   min [] Estim, type/location:                                      []  att     []  unatt     []  w/US     []  w/ice    []  w/heat    min []  Mechanical Traction: type/lbs                   []  pro   []  sup   []  int   []  cont    []  before manual    []  after manual    min []  Ultrasound, settings/location:      min []  Iontophoresis w/ dexamethasone, location:                                               []  take home patch       []  in clinic   10 min [x]  Ice     []  Heat    location/position:  To the R knee in supine w/ bolster    min []  Vasopneumatic Device, press/temp:     min []  Other:    [x] Skin assessment post-treatment (if applicable):    [x]  intact    []  redness- no adverse reaction     []redness  adverse reaction:        34 min Therapeutic Exercise:  [x]  See flow sheet   Rationale:      increase ROM, increase strength and improve coordination to improve the patients ability to ambulate     10 min Manual Therapy: CFM to scar, inf patella mobs, STM to distal VL/ITB; PROM knee flexion   Rationale:      decrease pain, increase ROM, increase tissue extensibility and decrease trigger points to improve patient's ability to ambulate     min Patient Education:  yes  Reviewed HEP   []  Progressed/Changed HEP based on: Other Objective/Functional Measures:    Inc ttp to the R VL this visit  Reduced weight SL LP due to c/o inc pain     Post Treatment Pain Level (on 0 to 10) scale:   3  / 10     ASSESSMENT  Assessment/Changes in Function:     Pt reported dec pain post tx session. No visible inc in effusion noted t/o today's session     []  See Progress Note/Recertification   Patient will continue to benefit from skilled PT services to modify and progress therapeutic interventions, address functional mobility deficits, address ROM deficits, address strength deficits, analyze and address soft tissue restrictions, analyze and cue movement patterns, analyze and modify body mechanics/ergonomics, assess and modify postural abnormalities and instruct in home and community integration to attain remaining goals.    Progress toward goals / Updated goals:    Slow progression to LTG #2, pain level 3-4/10 consistently     PLAN  []  Upgrade activities as tolerated yes Continue plan of care   []  Discharge due to :    []  Other:      Therapist: Delon Rooney PT    Date: 6/13/2018 Time: 8:34 AM     Future Appointments  Date Time Provider Eunice Wright   6/15/2018 8:00 AM Radha Foster PT [facility]   6/18/2018 10:00 AM Delon Rooney PT [facility]   6/20/2018 9:00 AM Delon Rooney PT [facility]   6/22/2018 8:00 AM Radha Foster PT [facility]

## 2018-06-15 ENCOUNTER — HOSPITAL ENCOUNTER (OUTPATIENT)
Dept: PHYSICAL THERAPY | Age: 63
Discharge: HOME | End: 2018-06-15
Payer: COMMERCIAL

## 2018-06-15 PROCEDURE — 97140 MANUAL THERAPY 1/> REGIONS: CPT

## 2018-06-15 PROCEDURE — 97110 THERAPEUTIC EXERCISES: CPT

## 2018-06-15 NOTE — PROGRESS NOTES
PHYSICAL THERAPY - DAILY TREATMENT NOTE    Patient Name: Peng Orellana        Date: 6/15/2018  : 1955   YES Patient  Verified  Visit #:   15   of   18  Insurance: Payor: Danii Aguilar / Plan: [address] [place] [address] Rd PT / Product Type: Commerical /      In time: 043 Out time: 849   Total Treatment Time: 48     Medicare Time Tracking (below)   Total Timed Codes (min):  na 1:1 Treatment Time:  na     TREATMENT AREA =  Right knee pain [M25.561]    SUBJECTIVE  Pain Level (on 0 to 10 scale):  0  / 10   Medication Changes/New allergies or changes in medical history, any new surgeries or procedures? NO    If yes, update Summary List   Subjective Functional Status/Changes:  []  No changes reported     Patient reports his lower back is bothering him more so than his R knee. Patient denies complications since his LV. OBJECTIVE  Modalities Rationale:     decrease inflammation and decrease pain to improve patient's ability to perform transfers. min [] Estim, type/location:                                      []  att     []  unatt     []  w/US     []  w/ice    []  w/heat    min []  Mechanical Traction: type/lbs                   []  pro   []  sup   []  int   []  cont    []  before manual    []  after manual    min []  Ultrasound, settings/location:      min []  Iontophoresis w/ dexamethasone, location:                                               []  take home patch       []  in clinic   10 min [x]  Ice     []  Heat    location/position: To R knee in supine with bolster    min []  Vasopneumatic Device, press/temp:     min []  Other:    [x] Skin assessment post-treatment (if applicable):    [x]  intact    []  redness- no adverse reaction     []redness  adverse reaction:        26 min Therapeutic Exercise:  [x]  See flow sheet   Rationale:      increase ROM, increase strength, improve coordination and improve balance to improve the patients ability to perform walking activities.       12 min Manual Therapy: STM to R quadriceps, R patellar mobs, R knee flexion PROM, R HS stretch   Rationale:      decrease pain, increase ROM, increase tissue extensibility and decrease trigger points to improve patient's ability to perform standing activities. min Patient Education:  YES  Reviewed HEP   []  Progressed/Changed HEP based on: Other Objective/Functional Measures:    R knee flexion PROM 125 degrees after manual therapy  Good knowledge of current exercise program, minimal cuing required to correct form/technique . Post Treatment Pain Level (on 0 to 10) scale:   0  / 10     ASSESSMENT  Assessment/Changes in Function:     Patient tolerated session well, reporting reduced pain. Patient states he returns to work Monday and will contact our office if he wishes to continue with PT further.      []  See Progress Note/Recertification   Patient will continue to benefit from skilled PT services to modify and progress therapeutic interventions, address functional mobility deficits, address ROM deficits, address strength deficits, analyze and address soft tissue restrictions, analyze and cue movement patterns, analyze and modify body mechanics/ergonomics and assess and modify postural abnormalities to attain remaining goals.    Progress toward goals / Updated goals:    Plan to DC to HEP pending patient symptoms with return to work      PLAN  [x]  Upgrade activities as tolerated YES Continue plan of care   []  Discharge due to :    []  Other:      Therapist: Rossana Gastelum PT    Date: 6/15/2018 Time: 9:27 AM     Future Appointments  Date Time Provider Eunice Wright   6/18/2018 10:00 AM [address] [place] [address], PT [facility]   6/20/2018 9:00 AM [address] [place] [address], PT [facility]   6/22/2018 8:00 AM Rossana Gastelum PT [facility]

## 2018-06-18 ENCOUNTER — HOSPITAL ENCOUNTER (OUTPATIENT)
Dept: PHYSICAL THERAPY | Age: 63
End: 2018-06-18
Payer: COMMERCIAL

## 2018-07-23 NOTE — PROGRESS NOTES
[address]  THERAPY  [address] [facility], [address] [facility], [place], [facility] - Phone: [phone]  Fax: [phone]  DISCHARGE SUMMARY  Patient Name: Kelly Dai : 1955   Treatment/Medical Diagnosis: Right knee pain [M25.561]   Referral Source: Sarah Beth Serra NP     Date of Initial Visit: 18 Attended Visits: 13 Missed Visits: 0     SUMMARY OF TREATMENT  Patient attended 12 follow up visits after his initial evaluation on 18. Patient received therapeutic exercise, manual therapy and modalities in order to improve R knee ROM, mobility, flexibility, strength and gait mechanics. CURRENT STATUS  Patient responded well to his PT appointment, noting improved AROM, strength and function. At patient's last appointment, his R knee measured to 125 degrees of flexion and near neutral extension. Patient denied any increase in pain or major limitations with ADL's. Patient has not attended a follow up appointment since 6/15/18, therefore a formal DC was not performed. Thank you for this referral.   RECOMMENDATIONS  Discontinue therapy. Progressing towards or have reached established goals. If you have any questions/comments please contact us directly at [phone]. Thank you for allowing us to assist in the care of your patient. Therapist Signature:  Charlyne Jeans, PT Date: 18     Time: 4:33 PM